# Patient Record
Sex: FEMALE | Race: WHITE | NOT HISPANIC OR LATINO | Employment: PART TIME | URBAN - METROPOLITAN AREA
[De-identification: names, ages, dates, MRNs, and addresses within clinical notes are randomized per-mention and may not be internally consistent; named-entity substitution may affect disease eponyms.]

---

## 2019-09-04 ENCOUNTER — OFFICE VISIT (OUTPATIENT)
Dept: OBSTETRICS AND GYNECOLOGY | Facility: CLINIC | Age: 39
End: 2019-09-04
Attending: OBSTETRICS & GYNECOLOGY
Payer: COMMERCIAL

## 2019-09-04 VITALS
DIASTOLIC BLOOD PRESSURE: 68 MMHG | WEIGHT: 209.31 LBS | HEIGHT: 67 IN | BODY MASS INDEX: 32.85 KG/M2 | SYSTOLIC BLOOD PRESSURE: 114 MMHG

## 2019-09-04 DIAGNOSIS — Z11.51 SCREENING FOR HUMAN PAPILLOMAVIRUS: ICD-10-CM

## 2019-09-04 DIAGNOSIS — Z01.419 ENCOUNTER FOR GYNECOLOGICAL EXAMINATION (GENERAL) (ROUTINE) WITHOUT ABNORMAL FINDINGS: ICD-10-CM

## 2019-09-04 DIAGNOSIS — Z11.3 SCREEN FOR STD (SEXUALLY TRANSMITTED DISEASE): ICD-10-CM

## 2019-09-04 DIAGNOSIS — Z12.4 SCREENING FOR MALIGNANT NEOPLASM OF CERVIX: Primary | ICD-10-CM

## 2019-09-04 DIAGNOSIS — F41.1 GAD (GENERALIZED ANXIETY DISORDER): ICD-10-CM

## 2019-09-04 PROCEDURE — 88175 CYTOPATH C/V AUTO FLUID REDO: CPT

## 2019-09-04 PROCEDURE — 99999 PR PBB SHADOW E&M-NEW PATIENT-LVL III: CPT | Mod: PBBFAC,,, | Performed by: OBSTETRICS & GYNECOLOGY

## 2019-09-04 PROCEDURE — 99999 PR PBB SHADOW E&M-NEW PATIENT-LVL III: ICD-10-PCS | Mod: PBBFAC,,, | Performed by: OBSTETRICS & GYNECOLOGY

## 2019-09-04 PROCEDURE — 99385 PREV VISIT NEW AGE 18-39: CPT | Mod: S$GLB,,, | Performed by: OBSTETRICS & GYNECOLOGY

## 2019-09-04 PROCEDURE — 99385 PR PREVENTIVE VISIT,NEW,18-39: ICD-10-PCS | Mod: S$GLB,,, | Performed by: OBSTETRICS & GYNECOLOGY

## 2019-09-04 PROCEDURE — 87624 HPV HI-RISK TYP POOLED RSLT: CPT

## 2019-09-04 PROCEDURE — 87491 CHLMYD TRACH DNA AMP PROBE: CPT

## 2019-09-04 RX ORDER — BUPROPION HYDROCHLORIDE 150 MG/1
TABLET ORAL
COMMUNITY
Start: 2019-04-02 | End: 2019-09-04 | Stop reason: SDUPTHER

## 2019-09-04 RX ORDER — BUPROPION HYDROCHLORIDE 150 MG/1
150 TABLET ORAL DAILY
Qty: 90 TABLET | Refills: 3 | Status: SHIPPED | OUTPATIENT
Start: 2019-09-04 | End: 2022-05-24

## 2019-09-04 NOTE — PROGRESS NOTES
Subjective:       Patient ID: Rosie Hughes is a 38 y.o. female.    Chief Complaint:  Well Woman (NP  --  last pap 2017,  Hpv+  (colpo Neg) )      There is no problem list on file for this patient.      History of Present Illness  38 y.o. yo  here for annual exam. New patient. No gyn complaints. Had Mirena, rare spotting. Moved to [facility] from [place]. 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 Doing well on Wellbutrin 150 and would like refill. Has appt with therapist. Had abnl pap in the past and was HPV positive 2017. Will repeat pap and HPV today- explained if positive then repeat colpo. Pt aware.     Past Medical History:   Diagnosis Date    Abnormal Pap smear of cervix     Colpo done in [place]     Abnormal Pap smear of cervix 2017    Hpv +   (Colpo, Negative)  in [place]    Anxiety and depression     Encounter for insertion of mirena IUD 2018    History of chlamydia     History of HPV infection     Vaccine for human papilloma virus (HPV) types 6, 11, 16, and 18 administered     3 of 3 received, per pt       Past Surgical History:   Procedure Laterality Date    COLPOSCOPY  2017    Negative      REFRACTIVE SURGERY   to        OB History    Para Term  AB Living   0 0 0 0 0 0   SAB TAB Ectopic Multiple Live Births   0 0 0 0 0   Obstetric Comments   Menarche ~ 20       Patient's last menstrual period was 2018 (approximate).   Date of Last Pap: No result found    Review of Systems  Review of Systems   Constitutional: Negative for fatigue and unexpected weight change.   Respiratory: Negative for shortness of breath.    Cardiovascular: Negative for chest pain.   Gastrointestinal: Negative for abdominal pain, constipation, diarrhea, nausea and vomiting.   Genitourinary: Negative for dysuria.   Musculoskeletal: Negative for back pain.   Skin: Negative for rash.   Neurological: Negative for headaches.   Hematological: Does not bruise/bleed easily.   Psychiatric/Behavioral:  Negative for behavioral problems.        Objective:   Physical Exam:   Constitutional: She is oriented to person, place, and time. Vital signs are normal. She appears well-developed and well-nourished. No distress.        Pulmonary/Chest: She exhibits no mass. Right breast exhibits no mass, no nipple discharge, no skin change, no tenderness, no bleeding and no swelling. Left breast exhibits no mass, no nipple discharge, no skin change, no tenderness, no bleeding and no swelling. Breasts are symmetrical.        Abdominal: Soft. Normal appearance and bowel sounds are normal. She exhibits no distension and no mass. There is no tenderness. There is no rebound.     Genitourinary: Vagina normal and uterus normal. There is no rash, tenderness, lesion or injury on the right labia. There is no rash, tenderness, lesion or injury on the left labia. Uterus is not deviated, not enlarged, not fixed, not tender, not hosting fibroids and not experiencing uterine prolapse. Cervix is normal. Right adnexum displays no mass, no tenderness and no fullness. Left adnexum displays no mass, no tenderness and no fullness. No erythema, tenderness, rectocele, cystocele or unspecified prolapse of vaginal walls in the vagina. No vaginal discharge found. Cervix exhibits no motion tenderness, no discharge and no friability. Additional cervical findings: IUD strings visualized          Musculoskeletal: Normal range of motion and moves all extremeties.      Lymphadenopathy:     She has no axillary adenopathy.        Right: No supraclavicular adenopathy present.        Left: No supraclavicular adenopathy present.    Neurological: She is alert and oriented to person, place, and time.    Skin: Skin is warm and dry.    Psychiatric: She has a normal mood and affect. Her behavior is normal. Judgment normal.        Assessment/ Plan:     1. Screening for malignant neoplasm of cervix  Liquid-based pap smear, screening   2. Screening for human papillomavirus   HPV High Risk Genotypes, PCR   3. Screen for STD (sexually transmitted disease)  C. trachomatis/N. gonorrhoeae by AMP DNA   4. Encounter for gynecological examination (general) (routine) without abnormal findings     5. VIANEY (generalized anxiety disorder)  buPROPion (WELLBUTRIN XL) 150 MG TB24 tablet       Follow-up with me in 1 year

## 2019-09-05 LAB
C TRACH DNA SPEC QL NAA+PROBE: NOT DETECTED
N GONORRHOEA DNA SPEC QL NAA+PROBE: NOT DETECTED

## 2019-09-10 LAB
HPV HR 12 DNA CVX QL NAA+PROBE: NEGATIVE
HPV16 AG SPEC QL: NEGATIVE
HPV18 DNA SPEC QL NAA+PROBE: NEGATIVE

## 2019-10-02 ENCOUNTER — OFFICE VISIT (OUTPATIENT)
Dept: INTERNAL MEDICINE | Facility: CLINIC | Age: 39
End: 2019-10-02
Payer: COMMERCIAL

## 2019-10-02 VITALS
TEMPERATURE: 99 F | SYSTOLIC BLOOD PRESSURE: 98 MMHG | DIASTOLIC BLOOD PRESSURE: 62 MMHG | BODY MASS INDEX: 32.8 KG/M2 | WEIGHT: 209 LBS | RESPIRATION RATE: 12 BRPM | HEART RATE: 60 BPM | HEIGHT: 67 IN

## 2019-10-02 DIAGNOSIS — Z86.19 HISTORY OF SHINGLES: ICD-10-CM

## 2019-10-02 DIAGNOSIS — Z00.00 ANNUAL PHYSICAL EXAM: Primary | ICD-10-CM

## 2019-10-02 DIAGNOSIS — F41.9 ANXIETY AND DEPRESSION: ICD-10-CM

## 2019-10-02 DIAGNOSIS — F32.A ANXIETY AND DEPRESSION: ICD-10-CM

## 2019-10-02 PROCEDURE — 99999 PR PBB SHADOW E&M-EST. PATIENT-LVL III: ICD-10-PCS | Mod: PBBFAC,,, | Performed by: INTERNAL MEDICINE

## 2019-10-02 PROCEDURE — 99385 PR PREVENTIVE VISIT,NEW,18-39: ICD-10-PCS | Mod: S$GLB,,, | Performed by: INTERNAL MEDICINE

## 2019-10-02 PROCEDURE — 99999 PR PBB SHADOW E&M-EST. PATIENT-LVL III: CPT | Mod: PBBFAC,,, | Performed by: INTERNAL MEDICINE

## 2019-10-02 PROCEDURE — 99385 PREV VISIT NEW AGE 18-39: CPT | Mod: S$GLB,,, | Performed by: INTERNAL MEDICINE

## 2019-10-02 RX ORDER — ESCITALOPRAM OXALATE 10 MG/1
10 TABLET ORAL DAILY
Qty: 30 TABLET | Refills: 11 | Status: SHIPPED | OUTPATIENT
Start: 2019-10-02 | End: 2022-05-24

## 2019-10-02 RX ORDER — AMOXICILLIN 500 MG
3 CAPSULE ORAL DAILY
COMMUNITY
End: 2022-05-24

## 2019-10-02 NOTE — PROGRESS NOTES
Subjective:       Patient ID: Rosie Hughes is a 38 y.o. female.    Chief Complaint: Annual Exam and Herpes Zoster (right arm)    HPI    38 y.o. female here for annual exam.     Health Maintenance/ Screening:   Lipid disorders/ASCVD risk: due    DM: A1c due  Cervical Cancer (21-65y):  09/2019      Vaccines:   Influenza (yearly) utd   Tetanus (every 10 yrs - 1st tdap) utd w/ employee health        Past Medical History:   Diagnosis Date    Abnormal Pap smear of cervix 2014    Colpo done in [place]     Abnormal Pap smear of cervix 2017    Hpv +   (Colpo, Negative)  in [place]    Anxiety and depression     Encounter for insertion of mirena IUD 02/2018    History of chlamydia 2014    History of HPV infection 2014    Vaccine for human papilloma virus (HPV) types 6, 11, 16, and 18 administered 2006, 2006, 2006    3 of 3 received, per pt     Past Surgical History:   Procedure Laterality Date    COLPOSCOPY  2014, 2017    Negative      REFRACTIVE SURGERY  2006 to 2018    WISDOM TOOTH EXTRACTION       Family History   Problem Relation Age of Onset    No Known Problems Father     K[place]akee's disease Mother     Depression Sister     Ovarian cancer Cousin     Breast cancer Neg Hx     Colon cancer Neg Hx     Cervical cancer Neg Hx     Uterine cancer Neg Hx     Prostate cancer Neg Hx      Social History     Socioeconomic History    Marital status: Single     Spouse name: Not on file    Number of children: Not on file    Years of education: Not on file    Highest education level: Not on file   Occupational History    Not on file   Social Needs    Financial resource strain: Not hard at all    Food insecurity:     Worry: Never true     Inability: Never true    Transportation needs:     Medical: No     Non-medical: No   Tobacco Use    Smoking status: Never Smoker    Smokeless tobacco: Never Used   Substance and Sexual Activity    Alcohol use: Yes     Frequency: 2-4 times a month     Drinks per session:  1 or 2     Binge frequency: Less than monthly     Comment: Social     Drug use: Never    Sexual activity: Yes     Partners: Male     Birth control/protection: IUD     Comment: Single:    Mirena  2/2018   Lifestyle    Physical activity:     Days per week: 4 days     Minutes per session: 30 min    Stress: Rather much   Relationships    Social connections:     Talks on phone: Three times a week     Gets together: Three times a week     Attends Confucianism service: Not on file     Active member of club or organization: Yes     Attends meetings of clubs or organizations: More than 4 times per year     Relationship status: Never    Other Topics Concern    Not on file   Social History Narrative    Not on file     Review of patient's allergies indicates:   Allergen Reactions    Penicillins Other (See Comments)     Unsure - As a Child     Shellfish containing products Other (See Comments)     Unsure -  Tested as an allergy       Current Outpatient Medications:     buPROPion (WELLBUTRIN XL) 150 MG TB24 tablet, Take 1 tablet (150 mg total) by mouth once daily., Disp: 90 tablet, Rfl: 3    fish oil-omega-3 fatty acids 300-1,000 mg capsule, Take 3 capsules by mouth once daily., Disp: , Rfl:     escitalopram oxalate (LEXAPRO) 10 MG tablet, Take 1 tablet (10 mg total) by mouth once daily., Disp: 30 tablet, Rfl: 11    varicella-zoster gE-AS01B, PF, (SHINGRIX, PF,) 50 mcg/0.5 mL injection, Inject 0.5 mLs into the muscle once. for 1 dose, Disp: 0.5 mL, Rfl: 1      Review of Systems   Constitutional: Negative for activity change and unexpected weight change.   HENT: Negative for hearing loss, rhinorrhea and trouble swallowing.    Eyes: Negative for discharge and visual disturbance.   Respiratory: Negative for chest tightness and wheezing.    Cardiovascular: Negative for chest pain and palpitations.   Gastrointestinal: Negative for blood in stool, constipation, diarrhea and vomiting.   Endocrine: Negative for  "polydipsia and polyuria.   Genitourinary: Negative for difficulty urinating, dysuria, hematuria and menstrual problem.   Musculoskeletal: Negative for arthralgias, joint swelling and neck pain.   Neurological: Negative for weakness and headaches.   Psychiatric/Behavioral: Positive for dysphoric mood. Negative for confusion.       Objective:        Vitals:    10/02/19 1440   BP: 98/62   Pulse: 60   Resp: 12   Temp: 98.7 °F (37.1 °C)   TempSrc: Oral   Weight: 94.8 kg (208 lb 15.9 oz)   Height: 5' 7" (1.702 m)       Body mass index is 32.73 kg/m².    Physical Exam   Constitutional: She is oriented to person, place, and time. She appears well-developed. No distress.   HENT:   Head: Normocephalic and atraumatic.   Right Ear: Tympanic membrane normal.   Left Ear: Tympanic membrane normal.   Nose: Nose normal.   Mouth/Throat: Oropharynx is clear and moist.   Eyes: Conjunctivae and EOM are normal.   Neck: Normal range of motion. Neck supple. No tracheal deviation present. No thyromegaly present.   Cardiovascular: Normal rate, regular rhythm, normal heart sounds and intact distal pulses.   Pulmonary/Chest: Effort normal and breath sounds normal.   Abdominal: Soft. Bowel sounds are normal. She exhibits no distension. There is no tenderness.   Musculoskeletal: Normal range of motion. She exhibits no edema.   Lymphadenopathy:     She has no cervical adenopathy.   Neurological: She is alert and oriented to person, place, and time. No sensory deficit.   Skin: Skin is warm and dry. She is not diaphoretic. No cyanosis. Nails show no clubbing.   Slightly erythematous scar from previous shingles on posterior right arm   Psychiatric: She has a normal mood and affect. Her behavior is normal. Judgment normal.       Assessment:     1. Annual physical exam    2. History of shingles    3. Anxiety and depression           Plan:         1. Annual physical exam  - vaccines utd w/ employee health  - CBC auto differential; Future  - " Comprehensive metabolic panel; Future  - Hemoglobin A1c; Future  - Lipid panel; Future  - TSH; Future  - Magnesium; Future  - Iron and TIBC; Future  - Vitamin D; Future    2. History of shingles  - recommend evaluation if symptoms return.   - varicella-zoster gE-AS01B, PF, (SHINGRIX, PF,) 50 mcg/0.5 mL injection; Inject 0.5 mLs into the muscle once. for 1 dose  Dispense: 0.5 mL; Refill: 1    3. Anxiety and depression  - not controlled w/ wellbutrin so add SSRI therapy to help with anxiety.   - escitalopram oxalate (LEXAPRO) 10 MG tablet; Take 1 tablet (10 mg total) by mouth once daily.  Dispense: 30 tablet; Refill: 11           Patient note was created using MModal Dictation.  Any errors in syntax or even information may not have been identified and edited on initial review prior to signing this note.

## 2019-10-05 ENCOUNTER — LAB VISIT (OUTPATIENT)
Dept: LAB | Facility: HOSPITAL | Age: 39
End: 2019-10-05
Attending: INTERNAL MEDICINE
Payer: COMMERCIAL

## 2019-10-05 DIAGNOSIS — Z00.00 ANNUAL PHYSICAL EXAM: ICD-10-CM

## 2019-10-05 LAB
25(OH)D3+25(OH)D2 SERPL-MCNC: 34 NG/ML (ref 30–96)
ALBUMIN SERPL BCP-MCNC: 3.4 G/DL (ref 3.5–5.2)
ALP SERPL-CCNC: 38 U/L (ref 55–135)
ALT SERPL W/O P-5'-P-CCNC: 14 U/L (ref 10–44)
ANION GAP SERPL CALC-SCNC: 7 MMOL/L (ref 8–16)
AST SERPL-CCNC: 26 U/L (ref 10–40)
BASOPHILS # BLD AUTO: 0.04 K/UL (ref 0–0.2)
BASOPHILS NFR BLD: 0.7 % (ref 0–1.9)
BILIRUB SERPL-MCNC: 0.4 MG/DL (ref 0.1–1)
BUN SERPL-MCNC: 19 MG/DL (ref 6–20)
CALCIUM SERPL-MCNC: 9 MG/DL (ref 8.7–10.5)
CHLORIDE SERPL-SCNC: 106 MMOL/L (ref 95–110)
CHOLEST SERPL-MCNC: 176 MG/DL (ref 120–199)
CHOLEST/HDLC SERPL: 2.9 {RATIO} (ref 2–5)
CO2 SERPL-SCNC: 27 MMOL/L (ref 23–29)
CREAT SERPL-MCNC: 1.1 MG/DL (ref 0.5–1.4)
DIFFERENTIAL METHOD: ABNORMAL
EOSINOPHIL # BLD AUTO: 0.1 K/UL (ref 0–0.5)
EOSINOPHIL NFR BLD: 2.1 % (ref 0–8)
ERYTHROCYTE [DISTWIDTH] IN BLOOD BY AUTOMATED COUNT: 12.3 % (ref 11.5–14.5)
EST. GFR  (AFRICAN AMERICAN): >60 ML/MIN/1.73 M^2
EST. GFR  (NON AFRICAN AMERICAN): >60 ML/MIN/1.73 M^2
ESTIMATED AVG GLUCOSE: 82 MG/DL (ref 68–131)
GLUCOSE SERPL-MCNC: 79 MG/DL (ref 70–110)
HBA1C MFR BLD HPLC: 4.5 % (ref 4–5.6)
HCT VFR BLD AUTO: 39.2 % (ref 37–48.5)
HDLC SERPL-MCNC: 60 MG/DL (ref 40–75)
HDLC SERPL: 34.1 % (ref 20–50)
HGB BLD-MCNC: 13.1 G/DL (ref 12–16)
IMM GRANULOCYTES # BLD AUTO: 0.01 K/UL (ref 0–0.04)
IMM GRANULOCYTES NFR BLD AUTO: 0.2 % (ref 0–0.5)
IRON SERPL-MCNC: 52 UG/DL (ref 30–160)
LDLC SERPL CALC-MCNC: 110.4 MG/DL (ref 63–159)
LYMPHOCYTES # BLD AUTO: 2 K/UL (ref 1–4.8)
LYMPHOCYTES NFR BLD: 34.7 % (ref 18–48)
MAGNESIUM SERPL-MCNC: 2.1 MG/DL (ref 1.6–2.6)
MCH RBC QN AUTO: 32 PG (ref 27–31)
MCHC RBC AUTO-ENTMCNC: 33.4 G/DL (ref 32–36)
MCV RBC AUTO: 96 FL (ref 82–98)
MONOCYTES # BLD AUTO: 0.4 K/UL (ref 0.3–1)
MONOCYTES NFR BLD: 6.9 % (ref 4–15)
NEUTROPHILS # BLD AUTO: 3.2 K/UL (ref 1.8–7.7)
NEUTROPHILS NFR BLD: 55.4 % (ref 38–73)
NONHDLC SERPL-MCNC: 116 MG/DL
NRBC BLD-RTO: 0 /100 WBC
PLATELET # BLD AUTO: 191 K/UL (ref 150–350)
PMV BLD AUTO: 10.2 FL (ref 9.2–12.9)
POTASSIUM SERPL-SCNC: 4.2 MMOL/L (ref 3.5–5.1)
PROT SERPL-MCNC: 6.9 G/DL (ref 6–8.4)
RBC # BLD AUTO: 4.1 M/UL (ref 4–5.4)
SATURATED IRON: 22 % (ref 20–50)
SODIUM SERPL-SCNC: 140 MMOL/L (ref 136–145)
TOTAL IRON BINDING CAPACITY: 238 UG/DL (ref 250–450)
TRANSFERRIN SERPL-MCNC: 161 MG/DL (ref 200–375)
TRIGL SERPL-MCNC: 28 MG/DL (ref 30–150)
TSH SERPL DL<=0.005 MIU/L-ACNC: 1.5 UIU/ML (ref 0.4–4)
WBC # BLD AUTO: 5.76 K/UL (ref 3.9–12.7)

## 2019-10-05 PROCEDURE — 83735 ASSAY OF MAGNESIUM: CPT

## 2019-10-05 PROCEDURE — 83036 HEMOGLOBIN GLYCOSYLATED A1C: CPT

## 2019-10-05 PROCEDURE — 80061 LIPID PANEL: CPT

## 2019-10-05 PROCEDURE — 36415 COLL VENOUS BLD VENIPUNCTURE: CPT | Mod: PO

## 2019-10-05 PROCEDURE — 85025 COMPLETE CBC W/AUTO DIFF WBC: CPT

## 2019-10-05 PROCEDURE — 82306 VITAMIN D 25 HYDROXY: CPT

## 2019-10-05 PROCEDURE — 83540 ASSAY OF IRON: CPT

## 2019-10-05 PROCEDURE — 84443 ASSAY THYROID STIM HORMONE: CPT

## 2019-10-05 PROCEDURE — 80053 COMPREHEN METABOLIC PANEL: CPT

## 2019-11-13 ENCOUNTER — PATIENT MESSAGE (OUTPATIENT)
Dept: VASCULAR SURGERY | Facility: CLINIC | Age: 39
End: 2019-11-13

## 2019-11-15 ENCOUNTER — PATIENT MESSAGE (OUTPATIENT)
Dept: VASCULAR SURGERY | Facility: CLINIC | Age: 39
End: 2019-11-15

## 2019-11-15 ENCOUNTER — PATIENT MESSAGE (OUTPATIENT)
Dept: INTERNAL MEDICINE | Facility: CLINIC | Age: 39
End: 2019-11-15

## 2019-11-15 DIAGNOSIS — I83.93 VARICOSE VEINS OF BOTH LOWER EXTREMITIES, UNSPECIFIED WHETHER COMPLICATED: Primary | ICD-10-CM

## 2019-11-15 DIAGNOSIS — I83.90 VARICOSE VEINS OF LOWER EXTREMITY, UNSPECIFIED LATERALITY, UNSPECIFIED WHETHER COMPLICATED: Primary | ICD-10-CM

## 2019-11-15 NOTE — TELEPHONE ENCOUNTER
How do you want to handle this?  See first to see if necessary?      If referral is ok, I can enter once I get phone/fax number and double check  Name of     Please advise.    Thank santos

## 2019-11-26 ENCOUNTER — PATIENT MESSAGE (OUTPATIENT)
Dept: INTERNAL MEDICINE | Facility: CLINIC | Age: 39
End: 2019-11-26

## 2019-11-26 DIAGNOSIS — B02.9 HERPES ZOSTER WITHOUT COMPLICATION: Primary | ICD-10-CM

## 2019-11-26 RX ORDER — VALACYCLOVIR HYDROCHLORIDE 1 G/1
1000 TABLET, FILM COATED ORAL 3 TIMES DAILY
Qty: 21 TABLET | Refills: 0 | Status: SHIPPED | OUTPATIENT
Start: 2019-11-26 | End: 2020-02-26 | Stop reason: SDUPTHER

## 2019-12-03 ENCOUNTER — NUTRITION (OUTPATIENT)
Dept: NUTRITION | Facility: CLINIC | Age: 39
End: 2019-12-03
Payer: COMMERCIAL

## 2019-12-03 DIAGNOSIS — Z71.3 DIETARY COUNSELING AND SURVEILLANCE: Primary | ICD-10-CM

## 2019-12-03 PROCEDURE — 97802 PR MED NUTR THER, 1ST, INDIV, EA 15 MIN: ICD-10-PCS | Mod: S$GLB,,, | Performed by: NUTRITIONIST

## 2019-12-03 PROCEDURE — 97802 MEDICAL NUTRITION INDIV IN: CPT | Mod: S$GLB,,, | Performed by: NUTRITIONIST

## 2019-12-04 NOTE — PROGRESS NOTES
"Nutrition Assessment for Initial Medical Nutrition Therapy    Referring professional: self referral    Reason for MNT visit: Pt in for education and nutrition counseling regarding nutritional counseling for goal of body fat loss    ASSESSMENT    Age: 38 y.o.  Wt: 200 lb  Weight: 200 lb  Ht: 5'7"  Ht Readings from Last 1 Encounters:   10/02/19 5' 7" (1.702 m)     BMI: 31.3  BMI Readings from Last 1 Encounters:   10/02/19 32.73 kg/m²       Clinical signs/symptoms: Low Energy. Patient stated she has trouble with food in the psychological form. She stated she has some kind of binge eating episodes, where I recommended her to see a food therapist    Medical History:   Past Medical History:   Diagnosis Date    Abnormal Pap smear of cervix 2014    Colpo done in [place]     Abnormal Pap smear of cervix 2017    Hpv +   (Colpo, Negative)  in [place]    Anxiety and depression     Encounter for insertion of mirena IUD 02/2018    History of chlamydia 2014    History of HPV infection 2014    Vaccine for human papilloma virus (HPV) types 6, 11, 16, and 18 administered 2006, 2006, 2006    3 of 3 received, per pt         Medications:   Current Outpatient Medications:     buPROPion (WELLBUTRIN XL) 150 MG TB24 tablet, Take 1 tablet (150 mg total) by mouth once daily., Disp: 90 tablet, Rfl: 3    escitalopram oxalate (LEXAPRO) 10 MG tablet, Take 1 tablet (10 mg total) by mouth once daily., Disp: 30 tablet, Rfl: 11    fish oil-omega-3 fatty acids 300-1,000 mg capsule, Take 3 capsules by mouth once daily., Disp: , Rfl:     valACYclovir (VALTREX) 1000 MG tablet, Take 1 tablet (1,000 mg total) by mouth 3 (three) times daily. for 7 days, Disp: 21 tablet, Rfl: 0    varicella-zoster gE-AS01B, PF, (SHINGRIX) 50 mcg/0.5 mL injection, Inject into the muscle., Disp: 0.5 mL, Rfl: 1    Supplements: Vit D, MVI and Nord[employer]'s Natural's Fish Oil    Food/medication interactions:   Wellbutrin (ANTIDEPRESSANT) - Avoid [facility] ([facility]); " may affect appetite and/or wt  Lexapro (ANTIDEPRESSANT, SSRI) - Avoid Tryptophan suppl- may increase drug side effects. Avoid [facility].    Food allergies  intolerances: Shellfish allergy    Labs:  Reviewed and noted  Hemoglobin A1C   Date Value Ref Range Status   10/05/2019 4.5 4.0 - 5.6 % Final     Comment:     ADA Screening Guidelines:  5.7-6.4%  Consistent with prediabetes  >or=6.5%  Consistent with diabetes  High levels of fetal hemoglobin interfere with the HbA1C  assay. Heterozygous hemoglobin variants (HbS, HgC, etc)do  not significantly interfere with this assay.   However, presence of multiple variants may affect accuracy.       Cholesterol   Date Value Ref Range Status   10/05/2019 176 120 - 199 mg/dL Final     Comment:     The National Cholesterol Education Program (NCEP) has set the  following guidelines (reference ranges) for Cholesterol:  Optimal.....................<200 mg/dL  Borderline High.............200-239 mg/dL  High........................> or = 240 mg/dL       Triglycerides   Date Value Ref Range Status   10/05/2019 28 (L) 30 - 150 mg/dL Final     Comment:     The National Cholesterol Education Program (NCEP) has set the  following guidelines (reference values) for triglycerides:  Normal......................<150 mg/dL  Borderline High.............150-199 mg/dL  High........................200-499 mg/dL       HDL   Date Value Ref Range Status   10/05/2019 60 40 - 75 mg/dL Final     Comment:     The National Cholesterol Education Program (NCEP) has set the  following guidelines (reference values) for HDL Cholesterol:  Low...............<40 mg/dL  Optimal...........>60 mg/dL       LDL Cholesterol   Date Value Ref Range Status   10/05/2019 110.4 63.0 - 159.0 mg/dL Final     Comment:     The National Cholesterol Education Program (NCEP) has set the  following guidelines (reference values) for LDL Cholesterol:  Optimal.......................<130 mg/dL  Borderline High...............130-159  mg/dL  High..........................160-189 mg/dL  Very High.....................>190 mg/dL         Typical day recall: Reviewed during consult    Beverages: Water: 1/2 to 1 gallon per day    Dining out: Infrequent, 1-2 times per week    Alcohol: nonsmoker; drinks rarely. It could be one sugary cocktail once a week or one sugary cocktail once every 4 weeks.    Lifestyle Influences  Support System: Self    Meal preparation/shopping: Self    Current Activity Level: varies. She did the [employer] GET FIIT program for 8 weeks. She does pole dancing twice a week, but hasn't done it consistently in awhile. She is about to work with a , which is her friend a couple times a week after the New Year    Patient motivation, anticipated barriers, expected compliance: Patient is slightly motivated and has verbalized understanding and intent to comply    DIAGNOSIS   Problem: Obese  Etiology: related to eating too many calories  Signs/Symptoms: AEB BMI of 31.3 and 24 hour recall    INTERVENTION  Nutrition prescription: estimated energy requirements:   [id number] calories   grams protein  120 grams carbohydrates  Focus on heart healthy fats  Fluid: 100 oz per day + sweat loss      Recommendations & Goals:  Patient goals and recommendations are tailored to the specific patient's needs, readiness to change, lifestyle, culture, skills, resources, & abilities. Strategies to help achieve these nutrition-related goals were discussed which can include but are not limited to SMART goal setting & mindful eating.     Aim for a minimum of 7 hours sleep   Exercise 60 minutes most days  Eat breakfast within 1-2 hours of waking up  Try not to skip any meals or snacks, not going more than 3-4 hours without eating.   At each meal and snack, try to include a source of fiber + lean protein + healthy fat.   Consider obtaining 15-20 grams of carbohydrates per meal and snack with the exception of dinner time.      Written Materials Provided  These resources are intended to assist the patient in making it easier to choose recommended options when eating out & to identify better-for-you brands at the grocery store:     Meal Planning Guide with recommendations discussed along with portion sizes and a customized meal plan    Eat Fit jessi card as a reminder to download the jessi to ones smart phone which provides: current Eat Fit partners, approved menu options, food labels for carb counting, & recipes    Fast Food Lite Guide   Eat Fit Grocery Product list    RD contact information- for patient to contact regarding any questions, needs, and/or concerns that may arise     MONITORING & EVALUATION    Communicated with healthcare provider    Documented plan for referral to appropriate agency/healthcare provider as needed    Comprehension: fair     Motivation to change: moderate    Follow-up:  Within 1 year    Counseling time: 1 Hour

## 2019-12-06 ENCOUNTER — HOSPITAL ENCOUNTER (OUTPATIENT)
Dept: VASCULAR SURGERY | Facility: CLINIC | Age: 39
Discharge: HOME | End: 2019-12-06
Attending: SURGERY
Payer: COMMERCIAL

## 2019-12-06 ENCOUNTER — OFFICE VISIT (OUTPATIENT)
Dept: VASCULAR SURGERY | Facility: CLINIC | Age: 39
End: 2019-12-06
Attending: SURGERY
Payer: COMMERCIAL

## 2019-12-06 VITALS
HEIGHT: 67 IN | SYSTOLIC BLOOD PRESSURE: 121 MMHG | HEART RATE: 56 BPM | DIASTOLIC BLOOD PRESSURE: 76 MMHG | TEMPERATURE: 98 F | WEIGHT: 200.63 LBS | BODY MASS INDEX: 31.49 KG/M2

## 2019-12-06 DIAGNOSIS — I83.93 VARICOSE VEINS OF BOTH LOWER EXTREMITIES, UNSPECIFIED WHETHER COMPLICATED: ICD-10-CM

## 2019-12-06 DIAGNOSIS — I83.813 VARICOSE VEINS OF BOTH LOWER EXTREMITIES WITH PAIN: ICD-10-CM

## 2019-12-06 DIAGNOSIS — I87.2 VENOUS INSUFFICIENCY: ICD-10-CM

## 2019-12-06 DIAGNOSIS — I87.2 VENOUS INSUFFICIENCY OF BOTH LOWER EXTREMITIES: Primary | ICD-10-CM

## 2019-12-06 PROCEDURE — 99999 PR PBB SHADOW E&M-EST. PATIENT-LVL III: CPT | Mod: PBBFAC,,, | Performed by: SURGERY

## 2019-12-06 PROCEDURE — 99244 PR OFFICE CONSULTATION,LEVEL IV: ICD-10-PCS | Mod: S$GLB,,, | Performed by: SURGERY

## 2019-12-06 PROCEDURE — 93970 PR US DUPLEX, UPPER OR LOWER EXT VENOUS,COMPLETE BILAT: ICD-10-PCS | Mod: S$GLB,,, | Performed by: SURGERY

## 2019-12-06 PROCEDURE — 99244 OFF/OP CNSLTJ NEW/EST MOD 40: CPT | Mod: S$GLB,,, | Performed by: SURGERY

## 2019-12-06 PROCEDURE — 99999 PR PBB SHADOW E&M-EST. PATIENT-LVL III: ICD-10-PCS | Mod: PBBFAC,,, | Performed by: SURGERY

## 2019-12-06 PROCEDURE — 93970 EXTREMITY STUDY: CPT | Mod: S$GLB,,, | Performed by: SURGERY

## 2019-12-06 NOTE — PROGRESS NOTES
Rosie Hughes  12/06/2019    HPI:  Ms. Hughes is a 38 y.o. female who is here today for evaluation of of varicose veins.  She states that she has lower extremity swelling for over 10 years.  The right leg is worse than the left.  She endorses pain at a varicose vein just above the right knee.  The pain gets worse through the day and is better in the morning.  The pain is more positional in that it is worse when kneeling.  She wears compression stockings 3-5 days a week that are knee high and states that they helped a little but have not relieved a lot of the symptoms.  No decreased range of motion.    No MI/stroke  Tobacco use: none    Past Medical History:   Diagnosis Date    Abnormal Pap smear of cervix 2014    Colpo done in [place]     Abnormal Pap smear of cervix 2017    Hpv +   (Colpo, Negative)  in [place]    Anxiety and depression     Encounter for insertion of mirena IUD 02/2018    History of chlamydia 2014    History of HPV infection 2014    Vaccine for human papilloma virus (HPV) types 6, 11, 16, and 18 administered 2006, 2006, 2006    3 of 3 received, per pt     Past Surgical History:   Procedure Laterality Date    COLPOSCOPY  2014, 2017    Negative      REFRACTIVE SURGERY  2006 to 2018    WISDOM TOOTH EXTRACTION       Family History   Problem Relation Age of Onset    No Known Problems Father     [place]'s disease Mother     Depression Sister     Ovarian cancer Cousin     Breast cancer Neg Hx     Colon cancer Neg Hx     Cervical cancer Neg Hx     Uterine cancer Neg Hx     Prostate cancer Neg Hx      Social History     Socioeconomic History    Marital status: Single     Spouse name: Not on file    Number of children: Not on file    Years of education: Not on file    Highest education level: Not on file   Occupational History    Not on file   Social Needs    Financial resource strain: Not hard at all    Food insecurity:     Worry: Never true     Inability: Never true     Transportation needs:     Medical: No     Non-medical: No   Tobacco Use    Smoking status: Never Smoker    Smokeless tobacco: Never Used   Substance and Sexual Activity    Alcohol use: Yes     Frequency: 2-4 times a month     Drinks per session: 1 or 2     Binge frequency: Less than monthly     Comment: Social     Drug use: Never    Sexual activity: Yes     Partners: Male     Birth control/protection: IUD     Comment: Single:    Mirena  2/2018   Lifestyle    Physical activity:     Days per week: 4 days     Minutes per session: 30 min    Stress: Rather much   Relationships    Social connections:     Talks on phone: Three times a week     Gets together: Three times a week     Attends Sikhism service: Not on file     Active member of club or organization: Yes     Attends meetings of clubs or organizations: More than 4 times per year     Relationship status: Never    Other Topics Concern    Not on file   Social History Narrative    Not on file       Current Outpatient Medications:     buPROPion (WELLBUTRIN XL) 150 MG TB24 tablet, Take 1 tablet (150 mg total) by mouth once daily., Disp: 90 tablet, Rfl: 3    escitalopram oxalate (LEXAPRO) 10 MG tablet, Take 1 tablet (10 mg total) by mouth once daily., Disp: 30 tablet, Rfl: 11    fish oil-omega-3 fatty acids 300-1,000 mg capsule, Take 3 capsules by mouth once daily., Disp: , Rfl:     varicella-zoster gE-AS01B, PF, (SHINGRIX) 50 mcg/0.5 mL injection, Inject into the muscle., Disp: 0.5 mL, Rfl: 1    valACYclovir (VALTREX) 1000 MG tablet, Take 1 tablet (1,000 mg total) by mouth 3 (three) times daily. for 7 days, Disp: 21 tablet, Rfl: 0     REVIEW OF SYSTEMS:  General: negative; Respiratory: no cough, shortness of breath, or wheezing; Cardiovascular: no chest pain or dyspnea on exertion; Gastrointestinal: no abdominal pain, change in bowel habits, or bloody stools; Genito-Urinary: no dysuria, trouble voiding, or hematuria; Musculoskeletal: no weakness  or decreased range of motion, Neurological: no TIA or stroke symptoms; Psychiatric: no nervousness, anxiety or depression.    PHYSICAL EXAM:   Right Arm BP - Sittin/75 (19 1425)  Left Arm BP - Sittin/76 (19 1425)  Pulse: (!) 56  Temp: 97.9 °F (36.6 °C)    General appearance: Alert, well-appearing, and in no distress.  Oriented to person, place, and time  Neurological: Normal speech, no focal findings noted; CN II - XII grossly intact  Musculoskeletal:Digits/nail without cyanosis/clubbing.  Normal muscle strength/tone.  Neck: Supple, no significant adenopathy, no carotid bruit can be auscultated  Chest:Clear to auscultation, no wheezes, rales or rhonchi, symmetric air entry, No use of accessory muscles   Cardiac:Normal rate and regular rhythm, S1 and S2 normal  Abdomen:Soft, nontender, nondistended, no masses or organomegaly, No rebound tenderness noted; bowel sounds normal, Pulsatile aortic mass is not palpable. No groin adenopathy   Extremities: 2+ femoral pulses bilaterally, pedal pulses palpable. Mild pre-tibial edema. No ulcerations    LAB RESULTS:  Lab Results   Component Value Date    K 4.2 10/05/2019    CREATININE 1.1 10/05/2019     Lab Results   Component Value Date    WBC 5.76 10/05/2019    HCT 39.2 10/05/2019     10/05/2019     Lab Results   Component Value Date    HGBA1C 4.5 10/05/2019     IMAGING:  EXAM ISABEL: 2019 13:06  REF PHYS: AMNA MATIAS      Indication:  - Peripheral venous insufficiency.  Results:  Right Leg:        Compression       Color fill        Thrombosis    Common Femoral    complete          complete          none  Femoral prox      complete          complete          none  Femoral mid       complete          complete          none  Popliteal Fossa   complete          complete          none  Posterior Tibial  complete          complete          none  Peroneal          complete          complete          none  GSV               complete           complete          none  SSV               complete          complete          none  SFJ               complete          complete          none    Left Leg:         Compression       Color fill        Thrombosis    Common Femoral    complete          complete          none  Femoral prox      complete          complete          none  Femoral mid       complete          complete          none  Popliteal Fossa   complete          complete          none  Posterior Tibial  complete          complete          none  Peroneal          complete          complete          none  GSV               complete          complete          none  SSV               complete          complete          none  SFJ               complete          complete          none    The diagnostic criteria used in our lab: Significant reflux is present when retrograde flow is noted both in the vein and at the saphenofemoral junction (SFJ) or saphenopoliteal junction (SPJ)for 500 milliseconds (.5 second) following provocative   maneuvers when the patient  is in the  reverse trendelenburg position.    Venous Insufficiency:                    Right                                                 Left  ____________________________________________________________________________________                    Diam [mm]         Depth [mm]        Reflux            Diam [mm]         Depth [mm]        Reflux  Great Saphenous Veins:                Proximal Thigh    6.5               -                 -                 6.7               -                 -  Middle Thigh      4.9               -                 -                 5.3               -                 -  Distal Thigh      4.6               -                 -                 4.9               -                 -  Proximal Calf     3.8               -                 -                 4.5               -                 -    Report Summary:  Impression:   Right Leg:  Color flow evaluation of the lower  extremity demonstrates fully compressible and patent veins. There is no evidence of venous thrombosis in the deep or superficial veins.  There is significant reflux in the GSV including the saphenofemoral   junction. There is no significant reflux in the SSV including the saphenopopliteal junction or accessory saphenous vein.    Left Leg:  Color flow evaluation of the lower extremity demonstrates fully compressible and patent veins. There is no evidence of venous thrombosis in the deep or superficial veins.  There is significant reflux in the GSV including the saphenofemoral   junction. There is no significant reflux in the SSV including the saphenopopliteal junction or accessory saphenous vein.    IMP/PLAN:  38 y.o. female with bilateral GSV reflux on duplex and symptomatic leg swelling, varicose veins, and aching pain, R > L.  She has worn compression stockings for months in the past.  She would be a good candidate for EVLT and will represent to clinic in several months to discuss intervention. Duplex reviewed.    1)  Rx given for compression stockings  2) RTC in 2-3 months for EVLT setup    Tong Tolbert MD  Vascular & Endovascular Surgery

## 2019-12-06 NOTE — LETTER
December 6, 2019      Kaitlin Delaney MD  [address] [facility]  [place] [address]           [facility] Vascular Surgery  [address]  [place] [address]  Phone: [phone]  Fax: [phone]          Patient: Rosie Hughes   MR Number: [id number]   YOB: 1980   Date of Visit: 12/6/2019       Dear Dr. Kaitlin Delaney:    Thank you for referring Rosie Hughes to me for evaluation. Attached you will find relevant portions of my assessment and plan of care.    If you have questions, please do not hesitate to call me. I look forward to following Rosie Hughes along with you.    Sincerely,    Tong Tolbert MD    Enclosure  CC:  No Recipients    If you would like to receive this communication electronically, please contact [email or web address] or [phone] to request more information on [email or web address] Link access.    For providers and/or their staff who would like to refer a patient to [facility], please contact us through our one-stop-shop provider referral line, [facility] , at [phone].    If you feel you have received this communication in error or would no longer like to receive these types of communications, please e-mail [email or web address]

## 2020-01-12 ENCOUNTER — PATIENT MESSAGE (OUTPATIENT)
Dept: INTERNAL MEDICINE | Facility: CLINIC | Age: 40
End: 2020-01-12

## 2020-01-13 NOTE — TELEPHONE ENCOUNTER
Called and left message letting the patient know that we dont have another vaccine in the replace of the shingle vaccine.

## 2020-02-14 ENCOUNTER — PATIENT MESSAGE (OUTPATIENT)
Dept: VASCULAR SURGERY | Facility: CLINIC | Age: 40
End: 2020-02-14

## 2020-02-20 ENCOUNTER — TELEPHONE (OUTPATIENT)
Dept: VASCULAR SURGERY | Facility: CLINIC | Age: 40
End: 2020-02-20

## 2020-02-20 NOTE — TELEPHONE ENCOUNTER
----- Message from Nery Diamond sent at 2/20/2020  1:08 PM CST -----  Pt is calling about her up coming appt on 03/12/2020 and have some questions. And would like for the nurse to give her a call when she is free

## 2020-02-26 ENCOUNTER — PATIENT MESSAGE (OUTPATIENT)
Dept: INTERNAL MEDICINE | Facility: CLINIC | Age: 40
End: 2020-02-26

## 2020-02-26 ENCOUNTER — TELEPHONE (OUTPATIENT)
Dept: VASCULAR SURGERY | Facility: CLINIC | Age: 40
End: 2020-02-26

## 2020-02-26 ENCOUNTER — TELEPHONE (OUTPATIENT)
Dept: INTERNAL MEDICINE | Facility: CLINIC | Age: 40
End: 2020-02-26

## 2020-02-26 DIAGNOSIS — Z23 NEED FOR SHINGLES VACCINE: Primary | ICD-10-CM

## 2020-02-26 DIAGNOSIS — B02.9 HERPES ZOSTER WITHOUT COMPLICATION: ICD-10-CM

## 2020-02-26 RX ORDER — VALACYCLOVIR HYDROCHLORIDE 1 G/1
1000 TABLET, FILM COATED ORAL 3 TIMES DAILY
Qty: 21 TABLET | Refills: 0 | Status: SHIPPED | OUTPATIENT
Start: 2020-02-26 | End: 2022-05-24

## 2020-02-26 NOTE — TELEPHONE ENCOUNTER
----- Message from Juanito Paul PharmD sent at 2/26/2020  2:15 PM CST -----  Regarding: shingrix  Good afternoon! Mrs. Hughes's insurance will not cover the Shingrix vaccination in the pharmacy due to her age. Her medical insurance may cover the vaccination in the office. If you have any questions please call ext [phone].    Thank you,  Zack RossD

## 2020-02-26 NOTE — TELEPHONE ENCOUNTER
----- Message from Diego Mahan sent at 2/26/2020  9:43 AM CST -----  Contact: Pt      The Pt would like a call back about her 3 month f/u appt.    Phone #  [phone]

## 2020-02-26 NOTE — TELEPHONE ENCOUNTER
Valtrex rx sent to pharmacy to treat shingles outbreak.   Shingles vaccine (shingrix) prescription sent to [facility]- they will start prior authorization to get insurance coverage

## 2020-02-26 NOTE — TELEPHONE ENCOUNTER
Spoke to Juanito at [facility] and she will try getting prior auth for the shingles vaccine due to pt having shingles more than once. She said it will likely be denied due to her age but will give it a try.

## 2020-03-05 ENCOUNTER — OFFICE VISIT (OUTPATIENT)
Dept: VASCULAR SURGERY | Facility: CLINIC | Age: 40
End: 2020-03-05
Attending: SURGERY
Payer: COMMERCIAL

## 2020-03-05 VITALS
HEIGHT: 67 IN | TEMPERATURE: 99 F | WEIGHT: 207.25 LBS | SYSTOLIC BLOOD PRESSURE: 126 MMHG | DIASTOLIC BLOOD PRESSURE: 71 MMHG | HEART RATE: 67 BPM | BODY MASS INDEX: 32.53 KG/M2

## 2020-03-05 DIAGNOSIS — I87.2 VENOUS INSUFFICIENCY OF BOTH LOWER EXTREMITIES: Primary | ICD-10-CM

## 2020-03-05 DIAGNOSIS — I87.2 VENOUS INSUFFICIENCY: Primary | ICD-10-CM

## 2020-03-05 PROCEDURE — 99214 OFFICE O/P EST MOD 30 MIN: CPT | Mod: S$GLB,,, | Performed by: SURGERY

## 2020-03-05 PROCEDURE — 99214 PR OFFICE/OUTPT VISIT, EST, LEVL IV, 30-39 MIN: ICD-10-PCS | Mod: S$GLB,,, | Performed by: SURGERY

## 2020-03-05 PROCEDURE — 99999 PR PBB SHADOW E&M-EST. PATIENT-LVL III: ICD-10-PCS | Mod: PBBFAC,,, | Performed by: SURGERY

## 2020-03-05 PROCEDURE — 3008F BODY MASS INDEX DOCD: CPT | Mod: CPTII,S$GLB,, | Performed by: SURGERY

## 2020-03-05 PROCEDURE — 99999 PR PBB SHADOW E&M-EST. PATIENT-LVL III: CPT | Mod: PBBFAC,,, | Performed by: SURGERY

## 2020-03-05 PROCEDURE — 3008F PR BODY MASS INDEX (BMI) DOCUMENTED: ICD-10-PCS | Mod: CPTII,S$GLB,, | Performed by: SURGERY

## 2020-03-05 RX ORDER — ALPRAZOLAM 0.5 MG/1
0.5 TABLET ORAL ONCE AS NEEDED
Qty: 2 TABLET | Refills: 0 | Status: SHIPPED | OUTPATIENT
Start: 2020-03-05 | End: 2022-05-24

## 2020-03-05 NOTE — PROGRESS NOTES
Rosie Hughes  03/05/2020    Interval: Patient presents today for 3 months follow up of varicose veins. She reports wearing her compression stockings daily with no benefit. She continues to have pain R>L. Reports worse pain with running, as she is currently training for a half marathon.     HPI:  Ms. Hughes is a 39 y.o. female with history of extremity swelling for over 10 years.  The right leg is worse than the left.  She endorses pain at a varicose vein just above the right knee.  The pain gets worse through the day and is better in the morning.  The pain is more positional in that it is worse when kneeling.  She wears compression stockings 3-5 days a week that are knee high and states that they helped a little but have not relieved a lot of the symptoms.  No decreased range of motion.    No MI/stroke  Tobacco use: none    Past Medical History:   Diagnosis Date    Abnormal Pap smear of cervix 2014    Colpo done in [place]     Abnormal Pap smear of cervix 2017    Hpv +   (Colpo, Negative)  in [place]    Anxiety and depression     Encounter for insertion of mirena IUD 02/2018    History of chlamydia 2014    History of HPV infection 2014    Vaccine for human papilloma virus (HPV) types 6, 11, 16, and 18 administered 2006, 2006, 2006    3 of 3 received, per pt     Past Surgical History:   Procedure Laterality Date    COLPOSCOPY  2014, 2017    Negative      REFRACTIVE SURGERY  2006 to 2018    WISDOM TOOTH EXTRACTION       Family History   Problem Relation Age of Onset    No Known Problems Father     [place]'s disease Mother     Depression Sister     Ovarian cancer Cousin     Breast cancer Neg Hx     Colon cancer Neg Hx     Cervical cancer Neg Hx     Uterine cancer Neg Hx     Prostate cancer Neg Hx      Social History     Socioeconomic History    Marital status: Single     Spouse name: Not on file    Number of children: Not on file    Years of education: Not on file    Highest education  level: Not on file   Occupational History    Not on file   Social Needs    Financial resource strain: Not hard at all    Food insecurity:     Worry: Never true     Inability: Never true    Transportation needs:     Medical: No     Non-medical: No   Tobacco Use    Smoking status: Never Smoker    Smokeless tobacco: Never Used   Substance and Sexual Activity    Alcohol use: Yes     Frequency: 2-4 times a month     Drinks per session: 1 or 2     Binge frequency: Less than monthly     Comment: Social     Drug use: Never    Sexual activity: Yes     Partners: Male     Birth control/protection: IUD     Comment: Single:    Mirena  2/2018   Lifestyle    Physical activity:     Days per week: 4 days     Minutes per session: 30 min    Stress: Rather much   Relationships    Social connections:     Talks on phone: Three times a week     Gets together: Three times a week     Attends Episcopal service: Not on file     Active member of club or organization: Yes     Attends meetings of clubs or organizations: More than 4 times per year     Relationship status: Never    Other Topics Concern    Not on file   Social History Narrative    Not on file       Current Outpatient Medications:     buPROPion (WELLBUTRIN XL) 150 MG TB24 tablet, Take 1 tablet (150 mg total) by mouth once daily., Disp: 90 tablet, Rfl: 3    escitalopram oxalate (LEXAPRO) 10 MG tablet, Take 1 tablet (10 mg total) by mouth once daily., Disp: 30 tablet, Rfl: 11    fish oil-omega-3 fatty acids 300-1,000 mg capsule, Take 3 capsules by mouth once daily., Disp: , Rfl:     valACYclovir (VALTREX) 1000 MG tablet, Take 1 tablet (1,000 mg total) by mouth 3 (three) times daily. for 7 days, Disp: 21 tablet, Rfl: 0    varicella-zoster gE-AS01B, PF, (SHINGRIX) 50 mcg/0.5 mL injection, Inject into the muscle., Disp: 0.5 mL, Rfl: 1     REVIEW OF SYSTEMS:  General: negative; Respiratory: no cough, shortness of breath, or wheezing; Cardiovascular: no chest pain  or dyspnea on exertion; Gastrointestinal: no abdominal pain, change in bowel habits, or bloody stools; Genito-Urinary: no dysuria, trouble voiding, or hematuria; Musculoskeletal: no weakness or decreased range of motion, Neurological: no TIA or stroke symptoms; Psychiatric: no nervousness, anxiety or depression.    PHYSICAL EXAM:   Right Arm BP - Sittin/71 (20 0854)  Left Arm BP - Sittin/71 (20 0854)  Pulse: 67  Temp: 98.5 °F (36.9 °C)    General appearance: Alert, well-appearing, and in no distress.  Oriented to person, place, and time  Neurological: Normal speech, no focal findings noted; CN II - XII grossly intact  Musculoskeletal:Digits/nail without cyanosis/clubbing.  Normal muscle strength/tone.  Neck: Supple, no significant adenopathy, no carotid bruit can be auscultated  Chest:Clear to auscultation, no wheezes, rales or rhonchi, symmetric air entry, No use of accessory muscles   Cardiac:Normal rate and regular rhythm, S1 and S2 normal  Abdomen:Soft, nontender, nondistended, no masses or organomegaly, No rebound tenderness noted; bowel sounds normal, Pulsatile aortic mass is not palpable. No groin adenopathy   Extremities: 2+ femoral pulses bilaterally, pedal pulses palpable. Mild pre-tibial edema. No ulcerations    LAB RESULTS:  Lab Results   Component Value Date    K 4.2 10/05/2019    CREATININE 1.1 10/05/2019     Lab Results   Component Value Date    WBC 5.76 10/05/2019    HCT 39.2 10/05/2019     10/05/2019     Lab Results   Component Value Date    HGBA1C 4.5 10/05/2019     IMAGING:  EXAM ISABEL: 2019 13:06  REF PHYS: AMNA MATIAS      Indication:  - Peripheral venous insufficiency.  Results:  Right Leg:        Compression       Color fill        Thrombosis    Common Femoral    complete          complete          none  Femoral prox      complete          complete          none  Femoral mid       complete          complete          none  Popliteal Fossa   complete           complete          none  Posterior Tibial  complete          complete          none  Peroneal          complete          complete          none  GSV               complete          complete          none  SSV               complete          complete          none  SFJ               complete          complete          none    Left Leg:         Compression       Color fill        Thrombosis    Common Femoral    complete          complete          none  Femoral prox      complete          complete          none  Femoral mid       complete          complete          none  Popliteal Fossa   complete          complete          none  Posterior Tibial  complete          complete          none  Peroneal          complete          complete          none  GSV               complete          complete          none  SSV               complete          complete          none  SFJ               complete          complete          none    The diagnostic criteria used in our lab: Significant reflux is present when retrograde flow is noted both in the vein and at the saphenofemoral junction (SFJ) or saphenopoliteal junction (SPJ)for 500 milliseconds (.5 second) following provocative   maneuvers when the patient  is in the  reverse trendelenburg position.    Venous Insufficiency:                    Right                                                 Left  ____________________________________________________________________________________                    Diam [mm]         Depth [mm]        Reflux            Diam [mm]         Depth [mm]        Reflux  Great Saphenous Veins:                Proximal Thigh    6.5               -                 -                 6.7               -                 -  Middle Thigh      4.9               -                 -                 5.3               -                 -  Distal Thigh      4.6               -                 -                 4.9               -                 -  Proximal  Calf     3.8               -                 -                 4.5               -                 -    Report Summary:  Impression:   Right Leg:  Color flow evaluation of the lower extremity demonstrates fully compressible and patent veins. There is no evidence of venous thrombosis in the deep or superficial veins.  There is significant reflux in the GSV including the saphenofemoral   junction. There is no significant reflux in the SSV including the saphenopopliteal junction or accessory saphenous vein.    Left Leg:  Color flow evaluation of the lower extremity demonstrates fully compressible and patent veins. There is no evidence of venous thrombosis in the deep or superficial veins.  There is significant reflux in the GSV including the saphenofemoral   junction. There is no significant reflux in the SSV including the saphenopopliteal junction or accessory saphenous vein.    IMP/PLAN:  39 y.o. female with bilateral GSV reflux on duplex and symptomatic leg swelling, varicose veins, and aching pain, R > L.  She has worn compression stockings for several months now with no benefit. She would be a good candidate for EVLT.    1)  Continue wearing compression stockings  2) Will plan for EVLT R GSV - risks and benefits discussed in detail and she wishes to proceed  3 ) Rx given for todd Tolbert MD  Vascular & Endovascular Surgery

## 2020-04-07 ENCOUNTER — PATIENT MESSAGE (OUTPATIENT)
Dept: VASCULAR SURGERY | Facility: CLINIC | Age: 40
End: 2020-04-07

## 2020-04-21 DIAGNOSIS — Z01.84 ANTIBODY RESPONSE EXAMINATION: ICD-10-CM

## 2020-05-21 DIAGNOSIS — Z01.84 ANTIBODY RESPONSE EXAMINATION: ICD-10-CM

## 2020-06-20 DIAGNOSIS — Z01.84 ANTIBODY RESPONSE EXAMINATION: ICD-10-CM

## 2020-07-20 DIAGNOSIS — Z01.84 ANTIBODY RESPONSE EXAMINATION: ICD-10-CM

## 2020-08-19 DIAGNOSIS — Z01.84 ANTIBODY RESPONSE EXAMINATION: ICD-10-CM

## 2020-09-18 DIAGNOSIS — Z01.84 ANTIBODY RESPONSE EXAMINATION: ICD-10-CM

## 2020-09-22 ENCOUNTER — TELEPHONE (OUTPATIENT)
Dept: VASCULAR SURGERY | Facility: CLINIC | Age: 40
End: 2020-09-22

## 2020-09-22 ENCOUNTER — PATIENT MESSAGE (OUTPATIENT)
Dept: VASCULAR SURGERY | Facility: CLINIC | Age: 40
End: 2020-09-22

## 2020-09-22 DIAGNOSIS — I87.2 VENOUS INSUFFICIENCY: Primary | ICD-10-CM

## 2020-09-23 ENCOUNTER — PROCEDURE VISIT (OUTPATIENT)
Dept: VASCULAR SURGERY | Facility: CLINIC | Age: 40
End: 2020-09-23
Attending: SURGERY
Payer: COMMERCIAL

## 2020-09-23 VITALS
SYSTOLIC BLOOD PRESSURE: 137 MMHG | HEART RATE: 73 BPM | DIASTOLIC BLOOD PRESSURE: 91 MMHG | TEMPERATURE: 99 F | WEIGHT: 217.13 LBS | BODY MASS INDEX: 34.08 KG/M2 | HEIGHT: 67 IN

## 2020-09-23 DIAGNOSIS — I87.2 VENOUS INSUFFICIENCY: ICD-10-CM

## 2020-09-23 DIAGNOSIS — I87.2 VENOUS INSUFFICIENCY: Primary | ICD-10-CM

## 2020-09-23 PROCEDURE — 36478 PR ENDOVENOUS LASER, 1ST VEIN: ICD-10-PCS | Mod: RT,S$GLB,, | Performed by: SURGERY

## 2020-09-23 PROCEDURE — 36478 ENDOVENOUS LASER 1ST VEIN: CPT | Mod: RT,S$GLB,, | Performed by: SURGERY

## 2020-09-23 NOTE — PROCEDURES
"Rosie Hughes is a 39 y.o. female patient.    Temp: 98.7 °F (37.1 °C) (09/23/20 1306)  Pulse: 73 (09/23/20 1306)  BP: (!) 137/91 (09/23/20 1306)  Weight: 98.5 kg (217 lb 2.5 oz) (09/23/20 1306)  Height: 5' 7" (170.2 cm) (09/23/20 1306)       Procedures    Tong Tolbert  9/23/2020   Rosie Hughes    09/23/2020    Pre-Procedure Diagnosis: Right greater saphenous vein reflux; significant superficial venous insufficiency    Post-Procedure Diagnsosis: Same    Procedure: Laser endovenous ablation of the right greater saphenous vein    Surgeon: Tong Tolbert MD  Vascular & Endovascular Surgery        Anesthesia: Local    The skin of the leg was prepped and draped in sterile fashion.  Ultrasound-guidance was used throughout the procedure with a portable duplex ultrasound machine.  The right GSV was cannulated below the knee using a micro-puncture system.  An 0.035 wire J-tip followed by a 4-Fr Angiodynamics sheath was placed throughout the right GSV.   Using tumescent anesthesia, the entire sheathed portion of the GSV was anesthesized keeping the vein at least one cm from the skin; Klein pump was used.  Position of the tip of the laser was then reconfirmed to be approximately 2 cm from the saphenofemoral junction.  The 1470 nm laser was then activated and the entire length of the vein was treated at ~ 49 J/cm.  The fiber and sheath were then removed intact.  Duplex confirmed occlusion of the GSV with continued patency of the common femoral vein.   The incision was closed with Steri-strips.   Sterile compression dressings and a compression stocking were applied and the patient was discharged to home in a satisfactory condition.    Cannulation site: Below-the-knee    Sheath length: 40 cm    Baer of power: 7 W    Laser time: 191 sec    Joules: 1340.5 J      Tong Tolbert MD  Vascular & Endovascular Surgery       "

## 2020-10-01 ENCOUNTER — HOSPITAL ENCOUNTER (OUTPATIENT)
Dept: VASCULAR SURGERY | Facility: CLINIC | Age: 40
Discharge: HOME | End: 2020-10-01
Attending: SURGERY
Payer: COMMERCIAL

## 2020-10-01 ENCOUNTER — OFFICE VISIT (OUTPATIENT)
Dept: VASCULAR SURGERY | Facility: CLINIC | Age: 40
End: 2020-10-01
Attending: SURGERY
Payer: COMMERCIAL

## 2020-10-01 VITALS
BODY MASS INDEX: 32.7 KG/M2 | HEIGHT: 67 IN | TEMPERATURE: 99 F | WEIGHT: 208.31 LBS | HEART RATE: 63 BPM | DIASTOLIC BLOOD PRESSURE: 70 MMHG | RESPIRATION RATE: 20 BRPM | SYSTOLIC BLOOD PRESSURE: 127 MMHG

## 2020-10-01 DIAGNOSIS — I87.2 VENOUS INSUFFICIENCY: ICD-10-CM

## 2020-10-01 DIAGNOSIS — I87.2 VENOUS INSUFFICIENCY OF BOTH LOWER EXTREMITIES: Primary | ICD-10-CM

## 2020-10-01 PROCEDURE — 99214 OFFICE O/P EST MOD 30 MIN: CPT | Mod: S$GLB,,, | Performed by: SURGERY

## 2020-10-01 PROCEDURE — 99214 PR OFFICE/OUTPT VISIT, EST, LEVL IV, 30-39 MIN: ICD-10-PCS | Mod: S$GLB,,, | Performed by: SURGERY

## 2020-10-01 PROCEDURE — 99999 PR PBB SHADOW E&M-EST. PATIENT-LVL IV: ICD-10-PCS | Mod: PBBFAC,,, | Performed by: SURGERY

## 2020-10-01 PROCEDURE — 3008F PR BODY MASS INDEX (BMI) DOCUMENTED: ICD-10-PCS | Mod: CPTII,S$GLB,, | Performed by: SURGERY

## 2020-10-01 PROCEDURE — 93971 EXTREMITY STUDY: CPT | Mod: S$GLB,,, | Performed by: SURGERY

## 2020-10-01 PROCEDURE — 93971 PR US DUPLEX, UPPER OR LOWER EXT VENOUS,UNILAT OR LTD: ICD-10-PCS | Mod: S$GLB,,, | Performed by: SURGERY

## 2020-10-01 PROCEDURE — 99999 PR PBB SHADOW E&M-EST. PATIENT-LVL IV: CPT | Mod: PBBFAC,,, | Performed by: SURGERY

## 2020-10-01 PROCEDURE — 3008F BODY MASS INDEX DOCD: CPT | Mod: CPTII,S$GLB,, | Performed by: SURGERY

## 2020-10-01 NOTE — PROGRESS NOTES
"Rosie Hughes  10/01/2020    Interval (10/1) Post-op: Doing well.  Occasional "shocking" pain near her right knee popliteal fossa, otherwise no complaints.  Has noted a decrease in swelling.  Happy with results.      Interval: Patient presents today for 3 months follow up of varicose veins. She reports wearing her compression stockings daily with no benefit. She continues to have pain R>L. Reports worse pain with running, as she is currently training for a half marathon.     HPI:  Ms. Hughes is a 39 y.o. female with history of extremity swelling for over 10 years.  The right leg is worse than the left.  She endorses pain at a varicose vein just above the right knee.  The pain gets worse through the day and is better in the morning.  The pain is more positional in that it is worse when kneeling.  She wears compression stockings 3-5 days a week that are knee high and states that they helped a little but have not relieved a lot of the symptoms.  No decreased range of motion.    No MI/stroke  Tobacco use: none    Past Medical History:   Diagnosis Date    Abnormal Pap smear of cervix 2014    Colpo done in [place]     Abnormal Pap smear of cervix 2017    Hpv +   (Colpo, Negative)  in [place]    Anxiety and depression     Encounter for insertion of mirena IUD 02/2018    History of chlamydia 2014    History of HPV infection 2014    Vaccine for human papilloma virus (HPV) types 6, 11, 16, and 18 administered 2006, 2006, 2006    3 of 3 received, per pt     Past Surgical History:   Procedure Laterality Date    COLPOSCOPY  2014, 2017    Negative      REFRACTIVE SURGERY  2006 to 2018    WISDOM TOOTH EXTRACTION       Family History   Problem Relation Age of Onset    No Known Problems Father     Geary's disease Mother     Depression Sister     Ovarian cancer Cousin     Breast cancer Neg Hx     Colon cancer Neg Hx     Cervical cancer Neg Hx     Uterine cancer Neg Hx     Prostate cancer Neg Hx      Social " History     Socioeconomic History    Marital status: Single     Spouse name: Not on file    Number of children: Not on file    Years of education: Not on file    Highest education level: Not on file   Occupational History    Not on file   Social Needs    Financial resource strain: Not hard at all    Food insecurity     Worry: Never true     Inability: Never true    Transportation needs     Medical: No     Non-medical: No   Tobacco Use    Smoking status: Never Smoker    Smokeless tobacco: Never Used   Substance and Sexual Activity    Alcohol use: Yes     Frequency: 2-4 times a month     Drinks per session: 1 or 2     Binge frequency: Less than monthly     Comment: Social     Drug use: Never    Sexual activity: Yes     Partners: Male     Birth control/protection: I.U.D.     Comment: Single:    Mirena  2/2018   Lifestyle    Physical activity     Days per week: 4 days     Minutes per session: 30 min    Stress: Rather much   Relationships    Social connections     Talks on phone: Three times a week     Gets together: Three times a week     Attends [employer] service: Not on file     Active member of club or organization: Yes     Attends meetings of clubs or organizations: More than 4 times per year     Relationship status: Never    Other Topics Concern    Not on file   Social History Narrative    Not on file       Current Outpatient Medications:     fish oil-omega-3 fatty acids 300-1,000 mg capsule, Take 3 capsules by mouth once daily., Disp: , Rfl:     ALPRAZolam (XANAX) 0.5 MG tablet, Take 1 tablet (0.5 mg total) by mouth once as needed for Anxiety. Take one 0.5 mg tablet of xanax 1h before the EVLT procedure.  You may take a second tablet right before the procedure; please check with our nurse., Disp: 2 tablet, Rfl: 0    buPROPion (WELLBUTRIN XL) 150 MG TB24 tablet, Take 1 tablet (150 mg total) by mouth once daily., Disp: 90 tablet, Rfl: 3    escitalopram oxalate (LEXAPRO) 10 MG tablet,  Take 1 tablet (10 mg total) by mouth once daily., Disp: 30 tablet, Rfl: 11    valACYclovir (VALTREX) 1000 MG tablet, Take 1 tablet (1,000 mg total) by mouth 3 (three) times daily. for 7 days, Disp: 21 tablet, Rfl: 0    varicella-zoster gE-AS01B, PF, (SHINGRIX) 50 mcg/0.5 mL injection, Inject into the muscle. (Patient not taking: Reported on 10/1/2020), Disp: 0.5 mL, Rfl: 1    Current Facility-Administered Medications:     lidocaine-EPINEPHrine 1%-1:100,000 30 mL, lidocaine HCL 10 mg/ml (1%) 20 mL, sodium bicarbonate 10 mL in sodium chloride 0.9% 500 mL solution, , MISCELLANEOUS, 1 time in Clinic/HOD, Tong Tolbert MD     REVIEW OF SYSTEMS:  General: negative; Respiratory: no cough, shortness of breath, or wheezing; Cardiovascular: no chest pain or dyspnea on exertion; Gastrointestinal: no abdominal pain, change in bowel habits, or bloody stools; Genito-Urinary: no dysuria, trouble voiding, or hematuria; Musculoskeletal: no weakness or decreased range of motion, Neurological: no TIA or stroke symptoms; Psychiatric: no nervousness, anxiety or depression.    PHYSICAL EXAM:      Pulse: 63  Temp: 99 °F (37.2 °C)    General appearance: Alert, well-appearing, and in no distress.  Oriented to person, place, and time  Neurological: Normal speech, no focal findings noted; CN II - XII grossly intact  Musculoskeletal:Digits/nail without cyanosis/clubbing.  Normal muscle strength/tone.  Neck: Supple, no significant adenopathy, no carotid bruit can be auscultated  Chest:Clear to auscultation, no wheezes, rales or rhonchi, symmetric air entry, No use of accessory muscles   Cardiac:Normal rate and regular rhythm, S1 and S2 normal  Abdomen:Soft, nontender, nondistended, no masses or organomegaly, No rebound tenderness noted; bowel sounds normal, Pulsatile aortic mass is not palpable. No groin adenopathy   Extremities: 2+ femoral pulses bilaterally, pedal pulses palpable. Mild pre-tibial edema. No ulcerations    LAB  RESULTS:  Lab Results   Component Value Date    K 4.2 10/05/2019    CREATININE 1.1 10/05/2019     Lab Results   Component Value Date    WBC 5.76 10/05/2019    HCT 39.2 10/05/2019     10/05/2019     Lab Results   Component Value Date    HGBA1C 4.5 10/05/2019     IMAGING:  EXAM ISABEL: 12/06/2019 13:06  REF PHYS: POLLY AMNA      Indication:  - Peripheral venous insufficiency.  Results:  Right Leg:        Compression       Color fill        Thrombosis    Common Femoral    complete          complete          none  Femoral prox      complete          complete          none  Femoral mid       complete          complete          none  Popliteal Fossa   complete          complete          none  Posterior Tibial  complete          complete          none  Peroneal          complete          complete          none  GSV               complete          complete          none  SSV               complete          complete          none  SFJ               complete          complete          none    Left Leg:         Compression       Color fill        Thrombosis    Common Femoral    complete          complete          none  Femoral prox      complete          complete          none  Femoral mid       complete          complete          none  Popliteal Fossa   complete          complete          none  Posterior Tibial  complete          complete          none  Peroneal          complete          complete          none  GSV               complete          complete          none  SSV               complete          complete          none  SFJ               complete          complete          none    The diagnostic criteria used in our lab: Significant reflux is present when retrograde flow is noted both in the vein and at the saphenofemoral junction (SFJ) or saphenopoliteal junction (SPJ)for 500 milliseconds (.5 second) following provocative   maneuvers when the patient  is in the  reverse trendelenburg position.    Venous  Insufficiency:                    Right                                                 Left  ____________________________________________________________________________________                    Diam [mm]         Depth [mm]        Reflux            Diam [mm]         Depth [mm]        Reflux  Great Saphenous Veins:                Proximal Thigh    6.5               -                 -                 6.7               -                 -  Middle Thigh      4.9               -                 -                 5.3               -                 -  Distal Thigh      4.6               -                 -                 4.9               -                 -  Proximal Calf     3.8               -                 -                 4.5               -                 -    Report Summary:  Impression:   Right Leg:  Color flow evaluation of the lower extremity demonstrates fully compressible and patent veins. There is no evidence of venous thrombosis in the deep or superficial veins.  There is significant reflux in the GSV including the saphenofemoral   junction. There is no significant reflux in the SSV including the saphenopopliteal junction or accessory saphenous vein.    Left Leg:  Color flow evaluation of the lower extremity demonstrates fully compressible and patent veins. There is no evidence of venous thrombosis in the deep or superficial veins.  There is significant reflux in the GSV including the saphenofemoral   junction. There is no significant reflux in the SSV including the saphenopopliteal junction or accessory saphenous vein.      10/1/2020: After EVLT, thrombosis of GSV      IMP/PLAN:  39 y.o. female with bilateral GSV reflux on duplex and symptomatic leg swelling, varicose veins, and aching pain, R > L.  S/p R GSV EVLT on 9/23/2020 with improvement in symptoms.  Duplex shows thrombosed R GSV and no DVT.     -Doing well, can follow up with me as needed - will address LLE if symptoms worsen  - continue  compression and elevation       Tong Tolbert MD  Vascular & Endovascular Surgery

## 2020-10-05 ENCOUNTER — PATIENT MESSAGE (OUTPATIENT)
Dept: ADMINISTRATIVE | Facility: HOSPITAL | Age: 40
End: 2020-10-05

## 2020-10-18 DIAGNOSIS — Z01.84 ANTIBODY RESPONSE EXAMINATION: ICD-10-CM

## 2020-11-17 DIAGNOSIS — Z01.84 ANTIBODY RESPONSE EXAMINATION: ICD-10-CM

## 2021-01-04 ENCOUNTER — PATIENT MESSAGE (OUTPATIENT)
Dept: ADMINISTRATIVE | Facility: HOSPITAL | Age: 41
End: 2021-01-04

## 2021-04-05 ENCOUNTER — PATIENT MESSAGE (OUTPATIENT)
Dept: ADMINISTRATIVE | Facility: HOSPITAL | Age: 41
End: 2021-04-05

## 2021-07-06 ENCOUNTER — PATIENT MESSAGE (OUTPATIENT)
Dept: ADMINISTRATIVE | Facility: HOSPITAL | Age: 41
End: 2021-07-06

## 2021-10-04 ENCOUNTER — PATIENT MESSAGE (OUTPATIENT)
Dept: ADMINISTRATIVE | Facility: HOSPITAL | Age: 41
End: 2021-10-04

## 2022-01-18 ENCOUNTER — PATIENT MESSAGE (OUTPATIENT)
Dept: ADMINISTRATIVE | Facility: HOSPITAL | Age: 42
End: 2022-01-18
Payer: COMMERCIAL

## 2022-03-16 ENCOUNTER — PATIENT MESSAGE (OUTPATIENT)
Dept: ADMINISTRATIVE | Facility: HOSPITAL | Age: 42
End: 2022-03-16
Payer: COMMERCIAL

## 2022-06-13 ENCOUNTER — PATIENT OUTREACH (OUTPATIENT)
Dept: ADMINISTRATIVE | Facility: HOSPITAL | Age: 42
End: 2022-06-13
Payer: COMMERCIAL